# Patient Record
Sex: MALE | Race: WHITE | URBAN - METROPOLITAN AREA
[De-identification: names, ages, dates, MRNs, and addresses within clinical notes are randomized per-mention and may not be internally consistent; named-entity substitution may affect disease eponyms.]

---

## 2017-10-04 ENCOUNTER — EMERGENCY (EMERGENCY)
Facility: HOSPITAL | Age: 77
LOS: 1 days | Discharge: HOME | End: 2017-10-04
Admitting: EMERGENCY MEDICINE
Payer: MEDICARE

## 2017-10-04 VITALS
DIASTOLIC BLOOD PRESSURE: 73 MMHG | TEMPERATURE: 99 F | HEART RATE: 84 BPM | SYSTOLIC BLOOD PRESSURE: 162 MMHG | OXYGEN SATURATION: 99 % | RESPIRATION RATE: 18 BRPM

## 2017-10-04 VITALS
TEMPERATURE: 98 F | DIASTOLIC BLOOD PRESSURE: 60 MMHG | RESPIRATION RATE: 18 BRPM | HEART RATE: 78 BPM | OXYGEN SATURATION: 96 % | SYSTOLIC BLOOD PRESSURE: 135 MMHG

## 2017-10-04 DIAGNOSIS — R94.31 ABNORMAL ELECTROCARDIOGRAM [ECG] [EKG]: ICD-10-CM

## 2017-10-04 DIAGNOSIS — R07.89 OTHER CHEST PAIN: ICD-10-CM

## 2017-10-04 DIAGNOSIS — J18.9 PNEUMONIA, UNSPECIFIED ORGANISM: ICD-10-CM

## 2017-10-04 LAB
ALBUMIN SERPL ELPH-MCNC: 4.6 G/DL — SIGNIFICANT CHANGE UP (ref 3.4–5)
ALP SERPL-CCNC: 63 U/L — SIGNIFICANT CHANGE UP (ref 40–120)
ALT FLD-CCNC: 22 U/L — SIGNIFICANT CHANGE UP (ref 12–42)
ANION GAP SERPL CALC-SCNC: 8 MMOL/L — LOW (ref 9–16)
APTT BLD: 31.7 SEC — SIGNIFICANT CHANGE UP (ref 27.5–36.5)
AST SERPL-CCNC: 23 U/L — SIGNIFICANT CHANGE UP (ref 15–37)
BASOPHILS NFR BLD AUTO: 0.3 % — SIGNIFICANT CHANGE UP (ref 0–2)
BILIRUB SERPL-MCNC: 1 MG/DL — SIGNIFICANT CHANGE UP (ref 0.2–1.2)
BUN SERPL-MCNC: 19 MG/DL — SIGNIFICANT CHANGE UP (ref 7–23)
CALCIUM SERPL-MCNC: 9 MG/DL — SIGNIFICANT CHANGE UP (ref 8.5–10.5)
CHLORIDE SERPL-SCNC: 103 MMOL/L — SIGNIFICANT CHANGE UP (ref 96–108)
CK MB BLD-MCNC: 1.21 % — SIGNIFICANT CHANGE UP
CK MB CFR SERPL CALC: 2.2 NG/ML — SIGNIFICANT CHANGE UP (ref 0.5–3.6)
CK SERPL-CCNC: 182 U/L — SIGNIFICANT CHANGE UP (ref 39–308)
CO2 SERPL-SCNC: 25 MMOL/L — SIGNIFICANT CHANGE UP (ref 22–31)
CREAT SERPL-MCNC: 0.96 MG/DL — SIGNIFICANT CHANGE UP (ref 0.5–1.3)
EOSINOPHIL NFR BLD AUTO: 0.1 % — SIGNIFICANT CHANGE UP (ref 0–6)
GLUCOSE SERPL-MCNC: 125 MG/DL — HIGH (ref 70–99)
HCT VFR BLD CALC: 38 % — LOW (ref 39–50)
HGB BLD-MCNC: 12.5 G/DL — LOW (ref 13–17)
IMM GRANULOCYTES NFR BLD AUTO: 0.2 % — SIGNIFICANT CHANGE UP (ref 0–1.5)
INR BLD: 1.13 — SIGNIFICANT CHANGE UP (ref 0.88–1.16)
LYMPHOCYTES # BLD AUTO: 3.9 % — LOW (ref 13–44)
MAGNESIUM SERPL-MCNC: 1.9 MG/DL — SIGNIFICANT CHANGE UP (ref 1.6–2.6)
MCHC RBC-ENTMCNC: 23.1 PG — LOW (ref 27–34)
MCHC RBC-ENTMCNC: 32.9 G/DL — SIGNIFICANT CHANGE UP (ref 32–36)
MCV RBC AUTO: 70.4 FL — LOW (ref 80–100)
MONOCYTES NFR BLD AUTO: 18 % — HIGH (ref 2–14)
NEUTROPHILS NFR BLD AUTO: 77.5 % — HIGH (ref 43–77)
NT-PROBNP SERPL-SCNC: 275 PG/ML — SIGNIFICANT CHANGE UP
PLATELET # BLD AUTO: 210 K/UL — SIGNIFICANT CHANGE UP (ref 150–400)
POTASSIUM SERPL-MCNC: 3.9 MMOL/L — SIGNIFICANT CHANGE UP (ref 3.5–5.3)
POTASSIUM SERPL-SCNC: 3.9 MMOL/L — SIGNIFICANT CHANGE UP (ref 3.5–5.3)
PROT SERPL-MCNC: 8.5 G/DL — HIGH (ref 6.4–8.2)
PROTHROM AB SERPL-ACNC: 12.5 SEC — SIGNIFICANT CHANGE UP (ref 9.8–12.7)
RBC # BLD: 5.4 M/UL — SIGNIFICANT CHANGE UP (ref 4.2–5.8)
RBC # FLD: 22.5 % — HIGH (ref 10.3–16.9)
SODIUM SERPL-SCNC: 136 MMOL/L — SIGNIFICANT CHANGE UP (ref 132–145)
TROPONIN I SERPL-MCNC: 0.03 NG/ML — SIGNIFICANT CHANGE UP (ref 0.02–0.06)
TROPONIN I SERPL-MCNC: <0.017 NG/ML — LOW (ref 0.02–0.06)
WBC # BLD: 9.9 K/UL — SIGNIFICANT CHANGE UP (ref 3.8–10.5)
WBC # FLD AUTO: 9.9 K/UL — SIGNIFICANT CHANGE UP (ref 3.8–10.5)

## 2017-10-04 PROCEDURE — 93010 ELECTROCARDIOGRAM REPORT: CPT

## 2017-10-04 PROCEDURE — 71020: CPT | Mod: 26

## 2017-10-04 PROCEDURE — 71275 CT ANGIOGRAPHY CHEST: CPT | Mod: 26

## 2017-10-04 PROCEDURE — 99285 EMERGENCY DEPT VISIT HI MDM: CPT | Mod: 25

## 2017-10-04 RX ORDER — CIPROFLOXACIN LACTATE 400MG/40ML
1 VIAL (ML) INTRAVENOUS
Qty: 7 | Refills: 0 | OUTPATIENT
Start: 2017-10-04 | End: 2017-10-11

## 2017-10-04 RX ORDER — IBUPROFEN 200 MG
600 TABLET ORAL ONCE
Qty: 0 | Refills: 0 | Status: COMPLETED | OUTPATIENT
Start: 2017-10-04 | End: 2017-10-04

## 2017-10-04 RX ORDER — SODIUM CHLORIDE 9 MG/ML
1000 INJECTION INTRAMUSCULAR; INTRAVENOUS; SUBCUTANEOUS ONCE
Qty: 0 | Refills: 0 | Status: COMPLETED | OUTPATIENT
Start: 2017-10-04 | End: 2017-10-04

## 2017-10-04 RX ADMIN — SODIUM CHLORIDE 2000 MILLILITER(S): 9 INJECTION INTRAMUSCULAR; INTRAVENOUS; SUBCUTANEOUS at 16:48

## 2017-10-04 RX ADMIN — Medication 600 MILLIGRAM(S): at 19:39

## 2017-10-04 NOTE — ED PROVIDER NOTE - MEDICAL DECISION MAKING DETAILS
Pt with pmhx of aortic insufficiency and aortic aneurysm presents with chest pain and pain on inspiration that began this morning.  Pt denies any sob, fevers, chills.  labs, ekg, cxr, and cta.

## 2017-10-04 NOTE — ED ADULT TRIAGE NOTE - CHIEF COMPLAINT QUOTE
Patient to ED with complaint of neck pain beginning today that is now radiating to right side of chest.  Denies SOB.  No acute distress

## 2017-10-04 NOTE — ED PROVIDER NOTE - OBJECTIVE STATEMENT
77y M with hx of aortic insufficiency, aortic aneurysm, takes amlodipine, metoprolol, presents to ED c/o chest pain since this morning. Pt states that he woke up this morning with muscular pain on left neck/shoulder. Pt took 2 aspirin (4 hours ago) and put hot compress, which slightly relieved sx. Pt states that pain seemed to move around his neck. Pt has had similar muscular pain before, but states the pain never moves around his neck. Pt states that pain is constant but exacerbated when he takes deep breaths and is located in the middle of his chest, with some shoulder pain. Pt denies any increased pain when walking. Pt has a regular cardiologist, who he last saw 2 weeks ago, but did not call today. Denies SOB. 77y M with hx of aortic insufficiency, aortic aneurysm, takes amlodipine, metoprolol, presents to ED c/o chest pain since this morning. Pt states that he woke up this morning with muscular pain on left neck/shoulder. Pt took 2 aspirin (4 hours ago) and put hot compress, which slightly relieved sx. Pt states that pain seemed to move around his left chest. Pt states that pain is constant but exacerbated when he takes deep breaths and is located in the middle of his chest. Pt denies any increased pain when walking. Pt has a regular cardiologist, who he last saw 2 weeks ago, but did not call today. Denies SOB. Pt denies any increased leg swelling, fevers, diaphoresis.

## 2017-10-04 NOTE — ED PROVIDER NOTE - PROGRESS NOTE DETAILS
Pt reports felling better, cta shows effusion to left lung concern for delevoping pna and will treat.  trop neg x2 and discussed with cardiology pt ok for d/c and will f/u with his cardiologist this week.  Labs otherwise unremarkable and cta shows no dissection/pe

## 2017-10-04 NOTE — ED ADULT NURSE NOTE - OBJECTIVE STATEMENT
pt presents with dull midline chest pain starting today. no shortness of breath. states that warm compresses improved the pain at home and deep inspiration exacerbates the pain. no signs of distress, NSR on the monitor, resting in bed, updated on plan of care.

## 2024-09-13 NOTE — ED PROVIDER NOTE - NS_ACPWITHSCRIBE_ED_ALL_ED
[de-identified] : Assessment: 20-year-old transgender male with a lateral meniscus tear, patellofemoral syndrome in the left knee  Plan: I had a long discussion with the patient today regarding the nature of their diagnosis and treatment plan.  I reviewed the patient's imaging with them today in the office which demonstrates a horizontal longitudinal and free edge radial tear of the lateral meniscus with associated cyst, slightly worse compared to previous MRI in the setting of minimal arthritis and patellofemoral syndrome. We discussed the risks and benefits of no treatment as well as nonoperative and operative treatments. Nonoperative treatment would include resting, icing, heating, stretching, anti-inflammatory medicines as needed, activity/lifestyle modifications, physical therapy, possible cortisone injections, and a gradual return to activities as tolerated.  The benefits of nonsurgical treatment are that it avoids the risks and rehabilitation associated with surgery.  The disadvantages of nonsurgical treatment are that it may not completely alleviate the patient's symptoms.   Surgical treatment would include a left knee arthroscopy with lateral repair vs. partial meniscectomy, chondroplasty, and synovectomy.  The risks and benefits of surgery were discussed in detail.  The benefits include improved knee pain and function.  The risks include but are not limited to infection, blood loss, blood clots, neurovascular injury, stiffness/loss of range of motion, fracture, progressive arthritis, persistent pain, re-tearing of the meniscus, anesthesia related complications including paralysis and death, and the need for further surgery in the future.  We discussed the failure rates associated with meniscus repair as well as the differences in rehabilitation.  The patient understands that in the setting of a meniscus repair they will be protected weightbearing on crutches and in a brace for approximately 5 to 6 weeks.  In the setting of a partial meniscectomy they would be allowed to bear weight as tolerated with crutches for assistance without a brace.  Physical therapy will be recommended in order to optimize the patient's knee function postoperatively.  I expect most patients to return to unrestricted activity approximately 6 months after surgery in the setting of repair, however, the recovery would be accelerated in the setting of a partial meniscectomy.  Noncompliance with any postoperative restrictions and/or physical therapy could lead to a suboptimal outcome or failure of surgery.  The patient verbalizes their understanding of the surgical risks as well as the anticipated postoperative course.   They will speak with my surgical coordinator regarding presurgical testing as well as scheduling the procedure.    The patient will continue with conservative care for the right knee.  If symptoms persist in the future may consider an MRI.  The patient verbalizes understanding and agrees with the plan.  All questions were answered to their satisfaction. "I personally performed the services described in the documentation  recorded by the scribe in my presence, and it accurately and completely records my words and action.”